# Patient Record
Sex: MALE | Race: OTHER | HISPANIC OR LATINO
[De-identification: names, ages, dates, MRNs, and addresses within clinical notes are randomized per-mention and may not be internally consistent; named-entity substitution may affect disease eponyms.]

---

## 2017-02-13 ENCOUNTER — APPOINTMENT (OUTPATIENT)
Dept: ENDOCRINOLOGY | Facility: CLINIC | Age: 52
End: 2017-02-13

## 2017-02-13 VITALS
BODY MASS INDEX: 36.68 KG/M2 | SYSTOLIC BLOOD PRESSURE: 124 MMHG | HEART RATE: 80 BPM | DIASTOLIC BLOOD PRESSURE: 70 MMHG | WEIGHT: 242 LBS | HEIGHT: 68 IN

## 2017-02-13 LAB
GLUCOSE BLDC GLUCOMTR-MCNC: 121
HBA1C MFR BLD HPLC: 7.2

## 2017-03-13 ENCOUNTER — APPOINTMENT (OUTPATIENT)
Dept: ENDOCRINOLOGY | Facility: CLINIC | Age: 52
End: 2017-03-13

## 2017-03-13 VITALS
SYSTOLIC BLOOD PRESSURE: 132 MMHG | HEIGHT: 68 IN | HEART RATE: 78 BPM | DIASTOLIC BLOOD PRESSURE: 82 MMHG | WEIGHT: 240 LBS | BODY MASS INDEX: 36.37 KG/M2

## 2017-03-13 LAB — GLUCOSE BLDC GLUCOMTR-MCNC: 159

## 2017-04-24 ENCOUNTER — RX RENEWAL (OUTPATIENT)
Age: 52
End: 2017-04-24

## 2017-05-01 ENCOUNTER — APPOINTMENT (OUTPATIENT)
Dept: ENDOCRINOLOGY | Facility: CLINIC | Age: 52
End: 2017-05-01

## 2017-05-01 VITALS
DIASTOLIC BLOOD PRESSURE: 86 MMHG | BODY MASS INDEX: 36.37 KG/M2 | WEIGHT: 240 LBS | SYSTOLIC BLOOD PRESSURE: 140 MMHG | HEIGHT: 68 IN | HEART RATE: 80 BPM

## 2017-05-01 LAB
GLUCOSE BLDC GLUCOMTR-MCNC: 112
HBA1C MFR BLD HPLC: 6.5

## 2017-05-01 RX ORDER — CANAGLIFLOZIN 100 MG/1
100 TABLET, FILM COATED ORAL
Qty: 90 | Refills: 1 | Status: ACTIVE | COMMUNITY
Start: 2017-05-01 | End: 1900-01-01

## 2017-06-23 ENCOUNTER — RX RENEWAL (OUTPATIENT)
Age: 52
End: 2017-06-23

## 2017-07-17 ENCOUNTER — APPOINTMENT (OUTPATIENT)
Dept: ENDOCRINOLOGY | Facility: CLINIC | Age: 52
End: 2017-07-17

## 2017-09-11 ENCOUNTER — RX RENEWAL (OUTPATIENT)
Age: 52
End: 2017-09-11

## 2017-12-19 ENCOUNTER — RX RENEWAL (OUTPATIENT)
Age: 52
End: 2017-12-19

## 2017-12-20 ENCOUNTER — RX RENEWAL (OUTPATIENT)
Age: 52
End: 2017-12-20

## 2018-06-18 ENCOUNTER — RX RENEWAL (OUTPATIENT)
Age: 53
End: 2018-06-18

## 2018-09-05 ENCOUNTER — APPOINTMENT (OUTPATIENT)
Dept: ENDOCRINOLOGY | Facility: CLINIC | Age: 53
End: 2018-09-05

## 2018-12-17 ENCOUNTER — RX RENEWAL (OUTPATIENT)
Age: 53
End: 2018-12-17

## 2019-01-30 ENCOUNTER — RX RENEWAL (OUTPATIENT)
Age: 54
End: 2019-01-30

## 2021-06-16 PROBLEM — R01.1 CARDIAC MURMUR: Status: ACTIVE | Noted: 2021-06-16

## 2021-06-16 PROBLEM — R06.00 DOE (DYSPNEA ON EXERTION): Status: ACTIVE | Noted: 2021-06-16

## 2021-06-17 ENCOUNTER — APPOINTMENT (OUTPATIENT)
Dept: CARDIOLOGY | Facility: CLINIC | Age: 56
End: 2021-06-17
Payer: COMMERCIAL

## 2021-06-17 VITALS
TEMPERATURE: 98 F | OXYGEN SATURATION: 98 % | WEIGHT: 214 LBS | DIASTOLIC BLOOD PRESSURE: 92 MMHG | RESPIRATION RATE: 15 BRPM | HEIGHT: 68 IN | BODY MASS INDEX: 32.43 KG/M2 | SYSTOLIC BLOOD PRESSURE: 141 MMHG | HEART RATE: 115 BPM

## 2021-06-17 DIAGNOSIS — R01.1 CARDIAC MURMUR, UNSPECIFIED: ICD-10-CM

## 2021-06-17 DIAGNOSIS — E78.5 HYPERLIPIDEMIA, UNSPECIFIED: ICD-10-CM

## 2021-06-17 DIAGNOSIS — R10.13 EPIGASTRIC PAIN: ICD-10-CM

## 2021-06-17 DIAGNOSIS — R06.00 DYSPNEA, UNSPECIFIED: ICD-10-CM

## 2021-06-17 DIAGNOSIS — R00.0 TACHYCARDIA, UNSPECIFIED: ICD-10-CM

## 2021-06-17 DIAGNOSIS — Z82.3 FAMILY HISTORY OF STROKE: ICD-10-CM

## 2021-06-17 DIAGNOSIS — I10 ESSENTIAL (PRIMARY) HYPERTENSION: ICD-10-CM

## 2021-06-17 DIAGNOSIS — E11.65 TYPE 2 DIABETES MELLITUS WITH HYPERGLYCEMIA: ICD-10-CM

## 2021-06-17 PROCEDURE — 99072 ADDL SUPL MATRL&STAF TM PHE: CPT

## 2021-06-17 PROCEDURE — 99204 OFFICE O/P NEW MOD 45 MIN: CPT

## 2021-06-17 PROCEDURE — 93000 ELECTROCARDIOGRAM COMPLETE: CPT

## 2021-06-17 RX ORDER — METFORMIN HYDROCHLORIDE 500 MG/1
500 TABLET, COATED ORAL DAILY
Qty: 90 | Refills: 0 | Status: ACTIVE | COMMUNITY

## 2021-06-17 RX ORDER — ASPIRIN ENTERIC COATED TABLETS 81 MG 81 MG/1
81 TABLET, DELAYED RELEASE ORAL
Refills: 0 | Status: ACTIVE | COMMUNITY

## 2021-06-17 RX ORDER — LABETALOL HYDROCHLORIDE 200 MG/1
200 TABLET, FILM COATED ORAL TWICE DAILY
Qty: 180 | Refills: 1 | Status: ACTIVE | COMMUNITY
Start: 2021-06-17 | End: 1900-01-01

## 2021-06-17 NOTE — DISCUSSION/SUMMARY
[FreeTextEntry1] : This is a 55-year-old male with past medical history significant for non-insulin-dependent diabetes mellitus, hyperlipidemia, dyspepsia, palpitations, who comes in for cardiac consultation.\par He denies chest pain, shortness of breath, dizziness or syncope.  He feels that his heart is beating too quickly.  He also gets occasional dizziness.\par His cardiac risk factors include non-insulin-dependent diabetes mellitus, hyperlipidemia, history of smoking, (he stopped 31 years ago).  He does not drink excessive caffeine or alcohol.  No history of rheumatic fever.\par \par Electrocardiogram done June 17, 2021 demonstrated sinus tach coronary and 16 beats per minute is otherwise remarkable for left atrial abnormality and nonspecific ST-T wave changes.\par Blood work done May 26, 2021 was nonfasting, and demonstrated an elevated hemoglobin of 17.6 hematocrit of 51.5 (normal is up to 16.9/49.6), normal thyroid function tests, cholesterol 366 mg/dL, triglyceride 1220 mg/dL, HDL was unable to be calculated and LDL was unable to be calculated.  His liver function tests were elevated with an AST of 398, and ALT of 293 units per liter. His hemoglobin A1c is 7.6.\par The patient is currently taking this at the 2 g twice a day, fenofibrate 160 mg per day, Invokana 100 mg per day, lisinopril 10 mg per day, glipizide 5 mg per day, metformin 500 mg per day, Esomeprazole for dyspepsia.\par His blood pressure is elevated today and he will add labetalol 200 mg twice per day which should improve his blood pressure and help his sinus tachycardia.\par He will go for new blood work this week including CBC, SMA-20, fasting lipid panel, repeat liver function tests.  Given his risk profile the previous lab findings, he may have a fatty liver accounting for these findings.\par He does not snore at night time and does not feel he has sleep apnea with secondary causes of polycythemia need to be evaluated.\par He understands he must increase his hydration.  He will followup with me in one month to reevaluate his blood pressure, addresses new fasting lipid panel.\par His LDL target is less than 70 mg/dL.\par He is instructed to followup with his endocrinologist and primary care physician.\par Lifestyle modification, including exercise, dietary change, were reinforced.\par He will benefit from consultation with our registered dietitian.\par Further recommendations will admit the blood tests are Available for review.

## 2021-06-17 NOTE — REASON FOR VISIT
[CV Risk Factors and Non-Cardiac Disease] : CV risk factors and non-cardiac disease [Hyperlipidemia] : hyperlipidemia [Hypertension] : hypertension [FreeTextEntry3] : Dr. Grant

## 2021-06-18 PROBLEM — R10.13 DYSPEPSIA: Status: ACTIVE | Noted: 2021-06-18

## 2021-06-18 RX ORDER — LISINOPRIL 10 MG/1
10 TABLET ORAL
Qty: 90 | Refills: 1 | Status: ACTIVE | COMMUNITY

## 2021-06-18 RX ORDER — FENOFIBRATE 160 MG/1
160 TABLET ORAL
Qty: 90 | Refills: 1 | Status: ACTIVE | COMMUNITY

## 2021-06-18 RX ORDER — GLIPIZIDE 5 MG/1
5 TABLET, FILM COATED, EXTENDED RELEASE ORAL DAILY
Refills: 0 | Status: ACTIVE | COMMUNITY

## 2021-06-18 RX ORDER — ICOSAPENT ETHYL 1000 MG/1
1 CAPSULE ORAL TWICE DAILY
Qty: 360 | Refills: 0 | Status: ACTIVE | COMMUNITY

## 2021-08-05 ENCOUNTER — APPOINTMENT (OUTPATIENT)
Dept: CARDIOLOGY | Facility: CLINIC | Age: 56
End: 2021-08-05

## 2025-08-21 ENCOUNTER — APPOINTMENT (OUTPATIENT)
Age: 60
End: 2025-08-21